# Patient Record
Sex: FEMALE | Race: WHITE | NOT HISPANIC OR LATINO | Employment: FULL TIME | URBAN - METROPOLITAN AREA
[De-identification: names, ages, dates, MRNs, and addresses within clinical notes are randomized per-mention and may not be internally consistent; named-entity substitution may affect disease eponyms.]

---

## 2020-07-13 ENCOUNTER — APPOINTMENT (OUTPATIENT)
Dept: URGENT CARE | Facility: CLINIC | Age: 35
End: 2020-07-13
Payer: COMMERCIAL

## 2020-10-15 ENCOUNTER — GYNECOLOGY VISIT (OUTPATIENT)
Dept: OBGYN | Facility: CLINIC | Age: 35
End: 2020-10-15
Payer: COMMERCIAL

## 2020-10-15 DIAGNOSIS — O92.29 POSTPARTUM NIPPLE PAIN: ICD-10-CM

## 2020-10-15 DIAGNOSIS — O92.70 LACTATION PROBLEM: ICD-10-CM

## 2020-10-15 NOTE — PROGRESS NOTES
Summary: Symone has been struggling to breastfeed since birth. Since being home she attempts to latch at most or all feedings, then pumps and offers a bottle. She was using the Spectra successfully until it stopped working at which time she began using a hand pump every 1-3 hours. The process takes up to 2 hours leaving both parents feeling exhausted and overwhelmed. She has done an excellent job establishing a healthy milk supply and has grown baby Marylou very well. The plan at this time is to take a break from breastfeeding for the next few days. Double pump 8x every 24 hours for 15 minutes. Bottle fed every 2-3 hours during the day and up to 4 hours at night. At this time her Spectra is working but if that changes we discussed the option of renting a hospital grade pump. Will follow up in 5 days.     Subjective:     Symone Salmon is a 34 y.o. female here to establish lactation care. She is here today with  (Sang) and baby, Marylou.    Concerns:   Latch on difficulties , nipple pain , feeling that there is not enough milk , baby cries excessively, breast refusal  and baby always seems hungry     HPI:   Pertinent  history:   Mother does not have a history of advanced maternal age, GDM, hypertension prior to pregnancy, insulin resistance, multiple gestation, PCOS and thyroid disease. Common conditions which may interfere with milk supply.    Breast changes in pregnancy: Yes  History of breast surgeries: Yes, augmentation in 2011. Removed 2019. Reports wide spacing prior to augmentation.     FEEDING HISTORY:    Past breastfeeding history: First baby   Hospital course: Delivered at [facility], minimal help IP. Damaged nipples going home.   Currently 10/15/2020: Attempting to latch every 2-3 hours then using a manual pump and offering bottle. Feedings taking up to 2 hours. Was using Spectra but stopped working so switched to hand pump.   Both breasts: No   Bottle feeds: 8/24h  Not  on breast, bottle feeding and pumping    Supplement: Expressed breast milk, has not used any formula  Quantity: 30-45mls  How given/devices:  Bottle    Nipple Shield Use: None    Breast Pumping:   Frequency: 1-3 hours  Type of pump: Hand Pump   Has Spectra but stopped working and switched to hand pump  Flange size/type: 24mm  NO pain with pumping    ROS:  Constitutional: No fever, chills. Feeling well  Extremities Swelling: No extremity swelling  Gastrointestinal: Negative for nausea and vomiting  Breasts: No soreness of breasts and Soreness of nipples  Psychiatric: Feels exhausted and Feels overwhelmed directly related to feeding difficulty  Mental Health: No mention of feeling irritable, agitated, angry, overwhelmed, apathy, exhaustion nor having sleep changes outside infant feeds/demands or appetite changes.     Objective:     General: no acute distress  Neurological:  Alert and oriented x3  Breasts: Asymmetric , Soft, Plugged Duct - no evidence and Mastitis  - no S/S  Nipples: intact left side / scabbing on right which is healing   Psychiatric: Normal mood and affect. Her behavior is normal. Judgment and thought content normal   Mental Health: Did NOT exhibit sadness, crying, feeling overwhelmed, agitation or hypervigilance.       Assessment/Plan & Lactation Counseling:     Infant Weight History:   10/07/2020: 6# 1oz  10/15/2020: 6# 3.8oz    Pumped: Type of Pump: Hospital grade    Quantity Pumped: L 30mls    R 45mls    Total 75mls (Pumped 2 hours prior to appointment)  Suck Pattern on the bottle: Suck burst and normal rest and Disorganized  Behavior Following Observed Feeding: fussy until changed and dressed   Nipple Pain: Resolving, nipples healing   Nipple Pain from:Nipple damage from accumulated microtrauma which lowered failure strength resulting in sudden damage     Milk Supply Available: normal, establishing at day 8    Diagnosis/Problem  Lactation Problem, Baby not latching well    PLAN  Discussed  concerns and symptoms as listed above in assessment and guidance summarized below.  Topics reviewed included:  • Herbs and medications for increasing supply and their potential side effects and efficacy. No evidence base exists to support their use  • Maternal Mental Health: Discussed the feelings that come  • Sleep or lack of and strategies to manage restorative sleep, although short amounts, significant to the mental health of the mother.   • Self Care. Support, rest, getting out of the house each day. No chores for mom!   • Milk supply is dependent on glandular tissue development, hormonal influences, how many times the baby removes milk and how well the breasts are emptied in a 24 hour period. This is a biological reality that we can NOT work around. If, for any reason, your baby is not latching, or you are not able to nurse, then it is important for you to remove the milk instead by pumping or hand expression.  There's no magic trick, tea, food, drink, cookie or supplement that will increase your milk supply. One  must  effectively remove milk to continue to make and maximize milk. In the early days and weeks that can be 8+ times in 24 hours. For older babies, on average 6-7 + times in 24 hours.    o At this time we are relying on the pump to establish and protect milk supply  • Feeding:   o Feed your baby every 1.5-3 hours, more often if baby acts hungry.   - Up to 4 hours, 2x during the night  o Awaken baby for feeding if going over 3 hours in the day.   o Need to get in 8-12 feedings per 24 hours.   • Supplement: Expressed Breastmilk   ? Paced Bottle Feeding Video: https://www.[employer].com/watch?v=OmFZB9gKR8B  o Baby needs 2 ounces at each feeding, if still showing hunger cues offer an additional 10-15mls  • Pumping Guidelines:  ? Suggested Pump Routine: 3am, 6am, 8am, 10am, 1pm, 4pm, 7pm and 10pm  o Pump 8 times in 24 hours  o Type of pump:  - Spectra  - Always double pump  o How long will vary woman to  woman, typically 8-15 minutes, or 1-2 minutes after flow slows  o Flange Fit: Freely moving nipple in the tunnel with some movement of the areola.  - Today's evaluation indicates appropriate flange  • Increasing supply besides Galactogogues and Pumping:   o Warmth  o Relaxation   o Physical, auditory narratives  o Childbirth relaxation Techniques  o Acupuncture and acupressure  o Shoulder Massages  o Take a nap    • Nipple care:  • May apply breastmilk  • Moist-oily ointment after feeding/pumping, ie Lanolin nipple butter, coconut or olive oil, if desired/needed 2-3 times/day until nipples are healed  • You do not need to wash this off before pumping or feeding the baby    • Connect with other mothers:  o Facebook:   - [place] [facility]: [email or web address]  - Well-Nourished Babies (Private group for questions and support): [email or web address]  o Breastfeeding [place] for support not assessment: Tuesday  at 11am by Glassful,  you will be sent an invitation.   - You may instead copy and paste this link:    [email or web address]    - Passcode  [id number]  - You may share this link with friends; please don't post on social media      In Conclusion:   Family present has verbalized what they can realistically do based on family dynamics, understanding a plan has to be doable to be effective and can be renegotiated at any time.    This is a complex and intimate journey. When obstacles present themselves, it takes confidence, persistence and support. You are now the focus of our Breastfeeding Medicine team; we are here to support your decisions and goals.      Follow up requires close monitoring in this time sensitive window of opportunity to establish milk supply and facilitate the learning of  breastfeeding.    Mom is encouraged to e-mail to update how the plan is working.    Pediatrician appointment: October  21, 2020    Follow-up for infant weight check and dyad breastfeeding evaluation in 5 day(s)  Please call [phone] if you have not scheduled your next appointment    A total of 65 minutes were spent face to face with more than 50% spent counseling and coordinating care as detailed in the above note.        Mar Martinez

## 2020-10-20 ENCOUNTER — OFFICE VISIT (OUTPATIENT)
Dept: OBGYN | Facility: CLINIC | Age: 35
End: 2020-10-20
Payer: COMMERCIAL

## 2020-10-20 DIAGNOSIS — O92.29 POSTPARTUM NIPPLE PAIN: ICD-10-CM

## 2020-10-20 DIAGNOSIS — O92.70 LACTATION PROBLEM: ICD-10-CM

## 2020-10-20 PROCEDURE — 99215 OFFICE O/P EST HI 40 MIN: CPT | Performed by: NURSE PRACTITIONER

## 2020-10-20 NOTE — PROGRESS NOTES
Summary: Making milk with pumping diligently, no formula use. Tries to breastfeed but not convinced baby removing milk. Today baby removed 68% of the available milk, transferring 45ml.  Usually takes a 60ml feeding, great start and parents understand baby is learning but capable and needs practice to learn. Going forward, mom will practice 4x/day and not pump if the feeding was as good as in the office.  Follow up critical to follow weight for continued good growth. Infant 9oz over birthweight day 13.      Subjective:     Symone Salmon is a 34 y.o. female here to establish lactation care with me. She is here today with  (Sang) and baby, Marylou.    Concerns:   Latch on difficulties , nipple pain , feeling that there is not enough milk , baby cries excessively, breast refusal  and baby always seems hungry      HPI:   Pertinent  history:   Mother does not have a history of advanced maternal age, GDM, hypertension prior to pregnancy, insulin resistance, multiple gestation, PCOS and thyroid disease. Common conditions which may interfere with milk supply.     Breast changes in pregnancy: Yes  History of breast surgeries: Yes, augmentation in 2011. Removed 2019. Reports wide spacing prior to augmentation.      FEEDING HISTORY:    Past breastfeeding history: First baby   Hospital course: Delivered at [facility], minimal help IP. Damaged nipples going home.   Prior to visit on 10/15/2020: Attempting to latch every 2-3 hours then using a manual pump and offering bottle. Feedings taking up to 2 hours. Was using Spectra but stopped working so switched to hand pump.   Currently 10/20/2020 Pumping 8xday, offering breast but baby not always sucking and swallowing.   Both breasts: No   Bottle feeds: 8x/24h     Supplement: Expressed breast milk, has not used any formula  Quantity: 60-70mls  How given/devices:  Bottle     Nipple Shield Use: None     Breast Pumping:   Frequency: 3 hours  Type of  pump: Spectra   Flange size/type: 24mm  NO pain with pumping    ROS:  Constitutional: No fever, chills. Feeling well  Extremities Swelling: No extremity swelling  Gastrointestinal: Negative for nausea and vomiting  Breasts: No soreness of breasts and No soreness of nipples  Psychiatric: No mood changes and Managing ok  Mental Health: No mention of feeling irritable, agitated, angry, overwhelmed, apathy, exhaustion nor having sleep changes outside infant feeds/demands or appetite changes       Objective:     General: no acute distress  Neurological:  Alert and oriented x3  Breasts: Symetrical , Soft, Plugged Duct - no evidence and Mastitis  - no S/S  Nipples: intact  Psychiatric:  Normal mood and affect. Her behavior is normal. Judgment and thought content normal   Mental Health:  Did NOT exhibit sadness, crying, feeling overwhelmed, agitation or hypervigilance.     Assessment/Plan & Lactation Counseling:     Infant Weight History:   10/07/2020: 6# 1oz  10/15/2020: 6# 3.8oz  10/20/2020: 6#10.3oz    Infant intake at Breast:: L 0.7oz     R 0.8oz    Total 1.5oz  Milk Transfer at this feeding:   Effective breastfeeding  Pumped: Type of Pump: Spectra    Quantity Pumped: L 9ml    R 12ml    Total 21ml  Initiation of Feeding: Infant initiates  Position of Feeding:    Right: cross cradle  Left: cross cradle  Attachment Achieved: rapidly  Nipple shield: N/A       Suck Pattern at the breast: Suck burst and normal rest and Chewing    Behavior Following Observed Feeding: content     Latch: Mom latches independently  Suckling/Feeding: attaches, audible swallows, baby fed effectively, baby roots, elicits JANNIE and rhythmic  Milk Supply Available: normal     Diagnosis/Problem:  PP nipple pain improving  Lactation problem, baby not latching well at home      PLAN  Discussed concerns and symptoms as listed above in assessment and guidance summarized below.  Topics reviewed included:  •  Milk supply is dependent on glandular tissue  development, hormonal influences, how many times the baby removes milk and how well the breasts are emptied in a 24 hour period. This is a biological reality that we can NOT work around. If, for any reason, your baby is not latching, or you are not able to nurse, then it is important for you to remove the milk instead by pumping or hand expression.  There's no magic trick, tea, food, drink, cookie or supplement that will increase your milk supply. One  must  effectively remove milk to continue to make and maximize milk. In the early days and weeks that can be 8+ times in 24 hours. For older babies, on average 6-7 + times in 24 hours.    •  Feeding:   o Feed your baby every 1.5-3 hours, more often if baby acts hungry.   o Awaken baby for feeding if going over 3 hours in the day.   o Need to get in 8-12 feedings per 24 hours.   •  Given infants weight you may allow baby to go longer at night but that generally means shorter durations in the day.  o Breastfeed and if similar to today, let her rest  o Don't pump, wait until next time  o Top off if you feel the feeidng didn't go well  o Next feeding bottle and pump  - 4 feedings per day breastfeeding then increase with your own confidence  - Weight check important in 7-10 days  •  Supplement: No formula supplement is needed  o Continue with bottles unless breastfeeding  • Trust your reading of the baby  •  Positioning Techniques for bare breast  o Suggested positions Cross cradle  o Fine tune position by making sure your fingers beneath the breast as well as your bra, are out of the way of your baby's chin.  o Positioning:  Many positions shown, great sidelying at 7 minutes.   o See [email or web address]-[email or web address]-[email or web address]-[email or web address]  •  Latch on Techniques for bare breast.   o Fine tune latch:  - By holding your baby more securely at the breast, assisting your baby to stay attached by:  - Bringing your baby to your breast, not  breast to the baby  - Your baby's cheek to touch breast securely, nose tipped back  - Hold your baby firmly in place so when your baby forgets to suck and picks it back up again your baby is in the correct spot. You will be extinguishing behavior and replacing it with a deeper latch to stimulate suck and provide satisfaction at the breast  - Your baby needs as much breast as deep in the mouth as possible to allow your nipples to heal and for you more importantly to maximize efficiency at the breast  - Latch is asymmetrical, leading with the chin, getting more underneath.  •   Pumping Guidelines:  o Both breasts  o Pump 8 times in 24 hours unless more breastfeeding then can start decreasing based on good breastfeeding  o Type of pump:  - Spectra   - Always double pump  o How long will vary woman to woman, typically 8-15 minutes, or 1-2 minutes after flow slows  o Flange Fit: Freely moving nipple in the tunnel with some movement of the areola.  Today's evaluation indicates appropriate flange    •  Connect with other mothers:  o Facebook:   - [place] [facility]: [email or web address]  - Well-Nourished Babies (Private group for questions and support): [email or web address]/groups/[email or web address]  o Breastfeeding [place] for support not assessment: Tuesday  at 11am by Sharita,  you will be sent an invitation.   - You may instead copy and paste this link:    [email or web address]    - Passcode  [id number]  - You may share this link with friends; please don't post on social media      Follow up requires close monitoring in this time sensitive window of opportunity to facilitate the learning of  breastfeeding.    Mom is encouraged to e-mail to update how the plan is working.  Pediatrician appointment: Tomorrow for day 14  Follow-up for infant weight check and dyad breastfeeding evaluation in 7-10 day(s)  Please call [phone] if you have not  scheduled your next appointment    A total of 72 minutes, this does not include infant assessment time, were spent face to face with more than 50% spent counseling and coordinating care as detailed in the above note.     PLEASE NOTE: Some of this note was created using voice recognition software. I have made every reasonable attempt to correct obvious errors, but I expect that there may be errors of grammar and possibly content that I did not discover prior finalizing this note.  CHELSEA Priest.

## 2020-10-23 ENCOUNTER — NON-PROVIDER VISIT (OUTPATIENT)
Dept: OBGYN | Facility: CLINIC | Age: 35
End: 2020-10-23
Payer: COMMERCIAL

## 2020-10-23 NOTE — PROGRESS NOTES
Summary: Symone has been attempting to latch baby several times during the day, without success. She has done an excellent job protecting milk supply with continued pumping. Today, Marylou latched with the nipple shield but was not able to feed effectively. The plan at this time is to practice latching 1-2x during the day, continue to pump 8x every 24 hours, after breastfeeding or in place of breastfeeding. Follow up on 2020.    Subjective:     Symone Salmon is a 34 y.o. female here to establish lactation care with me. She is here today with  (Sang) and baby, Marylou.    Concerns: Latch on difficulties, breast refusal, weight check and feeding evaluation       HPI:   Pertinent  history:   Mother does not have a history of advanced maternal age, GDM, hypertension prior to pregnancy, insulin resistance, multiple gestation, PCOS and thyroid disease. Common conditions which may interfere with milk supply.     Breast changes in pregnancy: Yes  History of breast surgeries: Yes, augmentation in 2011. Removed 2019. Reports wide spacing prior to augmentation.      FEEDING HISTORY:    Past breastfeeding history: First baby   Hospital course: Delivered at [facility], minimal help IP. Damaged nipples going home.   Prior to visit on 10/15/2020: Attempting to latch every 2-3 hours then using a manual pump and offering bottle. Feedings taking up to 2 hours. Was using Spectra but stopped working so switched to hand pump.   Prior to 10/20/2020: Pumping 8xday, offering breast but baby not always sucking and swallowing.   Currently 10/23/2020: Pumping 8x, attempting to offer the breast with difficulty.   Both breasts: No   Bottle feeds: 8x/24h     Supplement: Expressed breast milk, has not used any formula  Quantity: 60-70mls  How given/devices:  Bottle     Nipple Shield Use: None     Breast Pumping:   Frequency: 2-3 hours  Type of pump: Spectra   Flange size/type: 24mm  NO  pain with pumping    ROS:  Constitutional: No fever, chills. Feeling well  Extremities Swelling: No extremity swelling  Gastrointestinal: Negative for nausea and vomiting  Breasts: No soreness of breasts and No soreness of nipples  Psychiatric: No mood changes and Managing ok  Mental Health: No mention of feeling irritable, agitated, angry, overwhelmed, apathy, exhaustion nor having sleep changes outside infant feeds/demands or appetite changes.     Objective:     General: no acute distress  Neurological:  Alert and oriented x3  Breasts: Symetrical , Soft, Plugged Duct - no evidence and Mastitis  - no S/S  Nipples: intact  Psychiatric:  Normal mood and affect. Her behavior is normal. Judgment and thought content normal   Mental Health:  Did NOT exhibit sadness, crying, feeling overwhelmed, agitation or hypervigilance.     Assessment/Plan & Lactation Counseling:     Infant Weight History:   10/07/2020: 6# 1oz  10/15/2020: 6# 3.8oz  10/20/2020: 6# 10.3oz  10/23/2020: 6# 13.2oz    Infant intake at Breast:: L 12mls     R 8mls    Total: 20mls  Milk Transfer at this feeding:    Ineffective breastfeeding  Pumped: Type of Pump: Spectra    Quantity Pumped: L 10mls    R 30mls    Total 40mls  Initiation of Feeding: Infant initiates  Position of Feeding:    Right: cross cradle  Left: cross cradle  Attachment Achieved: rapidly with nipple shield  Nipple shield: Ameda 24mm, Introduced today, Latch achieved with poor milk transfer  Suck Pattern at the breast: Suck burst and normal rest and Chewing    Behavior Following Observed Feeding: content     Latch: Mom latches independently  Suckling/Feeding: attaches, baby roots, elicits JANNIE and rhythmic  Milk Supply Available: normal     Diagnosis/Problem:  Lactation problem, baby not latching well at home      PLAN  Discussed concerns and symptoms as listed above in assessment and guidance summarized below.  Topics reviewed included:  • Milk supply is dependent on glandular tissue  development, hormonal influences, how many times the baby removes milk and how well the breasts are emptied in a 24 hour period. This is a biological reality that we can NOT work around. If, for any reason, your baby is not latching, or you are not able to nurse, then it is important for you to remove the milk instead by pumping or hand expression.  There's no magic trick, tea, food, drink, cookie or supplement that will increase your milk supply. One  must  effectively remove milk to continue to make and maximize milk. In the early days and weeks that can be 8+ times in 24 hours. For older babies, on average 6-7 + times in 24 hours.    o Will rely on the pump to protect milk supply while baby learns to breastfeed  • Feeding:   o Feed your baby every 1.5-3 hours, more often if baby acts hungry.   o Awaken baby for feeding if going over 3 hours in the day.   o Need to get in 8-12 feedings per 24 hours.   o Given infants weight you may allow baby to go longer at night but that generally means shorter durations in the day.  • Practicing at the Breast  o Breastfeed 1-2x during the day for practice  o Up to 10-15 minutes on each side  o Top off if you feel the feeidng didn't go well  o Weight check important in 7-10 days  •  Supplement: No formula supplement is needed  o Continue with bottles of expressed breastmilk   o Trust your reading of the baby  •  Positioning Techniques  o Suggested positions Cross cradle, practice with football on the right breast  o Fine tune position by making sure your fingers beneath the breast as well as your bra, are out of the way of your baby's chin.  o Positioning:  Many positions shown, great sidelying at 7 minutes.   o See http://globalhealthmedia.org/portfolio-items/positions-for-breastfeeding/?rnthiahnnHF=50256  •  Latch on Techniques    o Fine tune latch:  - By holding your baby more securely at the breast, assisting your baby to stay attached by:  - Bringing your baby to your  breast, not breast to the baby  - Your baby's cheek to touch breast securely, nose tipped back  - Hold your baby firmly in place so when your baby forgets to suck and picks it back up again your baby is in the correct spot. You will be extinguishing behavior and replacing it with a deeper latch to stimulate suck and provide satisfaction at the breast  - Your baby needs as much breast as deep in the mouth as possible to allow your nipples to heal and for you more importantly to maximize efficiency at the breast  - Latch is asymmetrical, leading with the chin, getting more underneath.  •   Pumping Guidelines:  o Both breasts  o Pump 8 times in 24 hours   o Type of pump:  o Spectra   o Always double pump  o How long will vary woman to woman, typically 8-15 minutes, or 1-2 minutes after flow slows  o Flange Fit: Freely moving nipple in the tunnel with some movement of the areola.  - Today's evaluation indicates appropriate flange    •  Connect with other mothers:  o Facebook:   - [place] Breast[facility]eds: [email or web address]  - Well-Nourished Babies (Private group for questions and support): https://www.facebook.com/groups/315887522607451/  o Breastfeeding [place] for support not assessment: Tuesday  at 11am by Leannaom,  you will be sent an invitation.   - You may instead copy and paste this link:    [email or web address]    - Passcode  [id number]  - You may share this link with friends; please don't post on social media      Follow up requires close monitoring in this time sensitive window of opportunity to facilitate the learning of  breastfeeding.    Mom is encouraged to e-mail to update how the plan is working.    Pediatrician appointment: 2 month well check, beginning of December    Follow-up for infant weight check and dyad breastfeeding evaluation in 10 day(s)  Please call [phone] if you have not scheduled your next appointment    A  total of 65 minutes were spent face to face with more than 50% spent counseling and coordinating care as detailed in the above note.     PLEASE NOTE: Some of this note was created using voice recognition software. I have made every reasonable attempt to correct obvious errors, but I expect that there may be errors of grammar and possibly content that I did not discover prior finalizing this note.    Mar Martinez

## 2020-11-02 ENCOUNTER — GYNECOLOGY VISIT (OUTPATIENT)
Dept: OBGYN | Facility: CLINIC | Age: 35
End: 2020-11-02
Payer: OTHER GOVERNMENT

## 2020-11-02 DIAGNOSIS — O92.70 LACTATION PROBLEM: ICD-10-CM

## 2020-11-02 PROCEDURE — 99215 OFFICE O/P EST HI 40 MIN: CPT | Performed by: NURSE PRACTITIONER

## 2020-11-02 NOTE — PROGRESS NOTES
Summary: Symone has been diligently pumping 8x every 24 hours, noticing a steady increase in her production. She attempts to latch 1-2x during the day, not feeling the feedings are effective. She is overwhelmed with triple feeding and feels her day is consumed with pumping/feeding. Today we discussed different options including cutting back on pumping up to weaning entirely from pumping. A decision does not have to be made immediately, but gives her options moving forward depending on how she is feeling about things as a whole. Follow up provided as needed.     Subjective:     Symone Salmon is a 35 y.o. female here for lactation care. She is here today with  (Sang) and baby, Marylou.    Concerns: Latch on difficulties, breast refusal, weight check and feeding evaluation       HPI:   Pertinent  history:   Mother does not have a history of advanced maternal age, GDM, hypertension prior to pregnancy, insulin resistance, multiple gestation, PCOS and thyroid disease. Common conditions which may interfere with milk supply.     Breast changes in pregnancy: Yes  History of breast surgeries: Yes, augmentation in 2011. Removed 2019. Reports wide spacing prior to augmentation.      FEEDING HISTORY:    Past breastfeeding history: First baby   Hospital course: Delivered at [facility], minimal help IP. Damaged nipples going home.   Prior to visit on 10/15/2020: Attempting to latch every 2-3 hours then using a manual pump and offering bottle. Feedings taking up to 2 hours. Was using Spectra but stopped working so switched to hand pump.   Prior to 10/20/2020: Pumping 8xday, offering breast but baby not always sucking and swallowing.   Prior to 10/23/2020: Pumping 8x, attempting to offer the breast with difficulty.   Currently 2020: Pumping 8x. Attempting to latch 1-2x during the day. Most of the time feeling like she doesn't get enough from the breast.   Both breasts: No   Bottle feeds:  8x/24h     Supplement: Expressed breast milk, has not used any formula  Quantity: 3-3.5oz  How given/devices:  Bottle, feeding taking up to 1 hour, using Dr. Otto, Level 1 nipples     Nipple Shield Use: None, reports baby does not latch well with it     Breast Pumping:   Frequency: 2-3 hours  Type of pump: Spectra   Flange size/type: 24mm  NO pain with pumping    ROS:  Constitutional: No fever, chills. Feeling well  Extremities Swelling: No extremity swelling  Gastrointestinal: Negative for nausea and vomiting  Breasts: No soreness of breasts and No soreness of nipples  Psychiatric: No mood changes and Managing ok  Mental Health: No mention of feeling irritable, agitated, angry, overwhelmed, apathy, exhaustion nor having sleep changes outside infant feeds/demands or appetite changes.      Objective:     General: no acute distress  Neurological:  Alert and oriented x3  Breasts: Symetrical , Soft, Plugged Duct - no evidence and Mastitis  - no S/S  Nipples: intact  Psychiatric:  Normal mood and affect. Her behavior is normal. Judgment and thought content normal   Mental Health:  Did NOT exhibit sadness, crying, feeling overwhelmed, agitation or hypervigilance.  11/2/2020: Symone reports she is struggling and has an appointment with a counselor/therapist tomorrow. Also provided contact information for [facility].      Assessment/Plan & Lactation Counseling:     Infant Weight History:   10/07/2020: 6# 1oz  10/15/2020: 6# 3.8oz  10/20/2020: 6# 10.3oz  10/23/2020: 6# 13.2oz  11/02/2020: 7# 9.5oz    Infant intake at Breast:: L 16mls     R 22mls    Total: 38mls  Milk Transfer at this feeding:    Ineffective breastfeeding, more effective than previous feeding  Pumped: Type of Pump: Spectra    Quantity Pumped: L 15mls    R 30mls    Total 45mls  Initiation of Feeding: Infant initiates  Position of Feeding:    Right: cross cradle  Left: cross cradle  Attachment Achieved: rapidly   Nipple shield: N/A    Behavior Following  Observed Feeding: rooting, bottle offered but fell asleep    Latch: Mom latches independently  Suckling/Feeding: attaches, baby roots, elicits JANNIE and rhythmic  Milk Supply Available: normal     Diagnosis/Problem:  Lactation problem, baby not latching well at home      PLAN  Discussed concerns and symptoms as listed above in assessment and guidance summarized below.  Topics reviewed included:  • Milk supply is dependent on glandular tissue development, hormonal influences, how many times the baby removes milk and how well the breasts are emptied in a 24 hour period. This is a biological reality that we can NOT work around. If, for any reason, your baby is not latching, or you are not able to nurse, then it is important for you to remove the milk instead by pumping or hand expression.  There's no magic trick, tea, food, drink, cookie or supplement that will increase your milk supply. One  must  effectively remove milk to continue to make and maximize milk. In the early days and weeks that can be 8+ times in 24 hours. For older babies, on average 6-7 + times in 24 hours.    • Decrease Pumping / Weaning from Pumping  o Can cut back on the number of times pumping in 24 hours   - 6-7x and reevaluate  o Option of weaning from pumping as desired  • Feeding:   o Feed your baby every 1.5-3 hours, more often if baby acts hungry.   o Given infants weight you may allow baby to go longer at night but that generally means shorter durations in the day.  • Supplement: No formula supplement is needed  o Continue with bottles of expressed breastmilk   o Trust your reading of the baby  • Pumping Guidelines:  o Both breasts  o Pump 8 times in 24 hours  • Discussed decreasing the total number of pumps over 24 hour period   o Type of pump:  o Spectra   o Always double pump  o How long will vary woman to woman, typically 8-15 minutes, or 1-2 minutes after flow slows  o Flange Fit: Freely moving nipple in the tunnel with some movement of  the areola.  - Today's evaluation indicates appropriate flange    •  Connect with other mothers:  o Facebook:   - [place] [facility]: [email or web address]/[email or web address].[email or web address]  - Well-Nourished Babies (Private group for questions and support): https://www.facebook.com/groups/988078526176953/  o Breastfeeding [place] for support not assessment: Tuesday  at 11am by idemama,  you will be sent an invitation.   - You may instead copy and paste this link:    [email or web address]    - Passcode  [id number]  - You may share this link with friends; please don't post on social media      Follow up requires close monitoring in this time sensitive window of opportunity to facilitate the learning of  breastfeeding.    Mom is encouraged to e-mail to update how the plan is working.    Pediatrician appointment: 2 month well check, beginning of December    Follow-up for infant weight check and dyad breastfeeding evaluation as needed  Please call [phone] if you have not scheduled your next appointment    A total of 68 minutes were spent face to face with more than 50% spent counseling and coordinating care as detailed in the above note.     PLEASE NOTE: Some of this note was created using voice recognition software. I have made every reasonable attempt to correct obvious errors, but I expect that there may be errors of grammar and possibly content that I did not discover prior finalizing this note.    Mar Martinez

## 2020-11-16 ENCOUNTER — PRE-ADMISSION TESTING (OUTPATIENT)
Dept: ADMISSIONS | Facility: MEDICAL CENTER | Age: 35
End: 2020-11-16
Attending: SPECIALIST
Payer: OTHER GOVERNMENT

## 2020-11-16 DIAGNOSIS — Z01.812 PRE-OPERATIVE LABORATORY EXAMINATION: ICD-10-CM

## 2020-11-16 LAB
ANION GAP SERPL CALC-SCNC: 7 MMOL/L (ref 7–16)
BUN SERPL-MCNC: 9 MG/DL (ref 8–22)
CALCIUM SERPL-MCNC: 10 MG/DL (ref 8.5–10.5)
CHLORIDE SERPL-SCNC: 102 MMOL/L (ref 96–112)
CO2 SERPL-SCNC: 27 MMOL/L (ref 20–33)
COVID ORDER STATUS COVID19: NORMAL
CREAT SERPL-MCNC: 0.84 MG/DL (ref 0.5–1.4)
ERYTHROCYTE [DISTWIDTH] IN BLOOD BY AUTOMATED COUNT: 38.6 FL (ref 35.9–50)
GLUCOSE SERPL-MCNC: 92 MG/DL (ref 65–99)
HCG SERPL QL: NEGATIVE
HCT VFR BLD AUTO: 41.4 % (ref 37–47)
HGB BLD-MCNC: 14.1 G/DL (ref 12–16)
MCH RBC QN AUTO: 31.7 PG (ref 27–33)
MCHC RBC AUTO-ENTMCNC: 34.1 G/DL (ref 33.6–35)
MCV RBC AUTO: 93 FL (ref 81.4–97.8)
PLATELET # BLD AUTO: 209 K/UL (ref 164–446)
PMV BLD AUTO: 11.2 FL (ref 9–12.9)
POTASSIUM SERPL-SCNC: 4.2 MMOL/L (ref 3.6–5.5)
RBC # BLD AUTO: 4.45 M/UL (ref 4.2–5.4)
SARS-COV-2 RNA RESP QL NAA+PROBE: NOTDETECTED
SODIUM SERPL-SCNC: 136 MMOL/L (ref 135–145)
SPECIMEN SOURCE: NORMAL
WBC # BLD AUTO: 8.8 K/UL (ref 4.8–10.8)

## 2020-11-16 PROCEDURE — 36415 COLL VENOUS BLD VENIPUNCTURE: CPT

## 2020-11-16 PROCEDURE — 80048 BASIC METABOLIC PNL TOTAL CA: CPT

## 2020-11-16 PROCEDURE — U0003 INFECTIOUS AGENT DETECTION BY NUCLEIC ACID (DNA OR RNA); SEVERE ACUTE RESPIRATORY SYNDROME CORONAVIRUS 2 (SARS-COV-2) (CORONAVIRUS DISEASE [COVID-19]), AMPLIFIED PROBE TECHNIQUE, MAKING USE OF HIGH THROUGHPUT TECHNOLOGIES AS DESCRIBED BY CMS-2020-01-R: HCPCS

## 2020-11-16 PROCEDURE — 84703 CHORIONIC GONADOTROPIN ASSAY: CPT

## 2020-11-16 PROCEDURE — 85027 COMPLETE CBC AUTOMATED: CPT

## 2020-11-19 ENCOUNTER — ANESTHESIA (OUTPATIENT)
Dept: SURGERY | Facility: MEDICAL CENTER | Age: 35
End: 2020-11-19
Payer: OTHER GOVERNMENT

## 2020-11-19 ENCOUNTER — HOSPITAL ENCOUNTER (OUTPATIENT)
Facility: MEDICAL CENTER | Age: 35
End: 2020-11-19
Attending: SPECIALIST | Admitting: SPECIALIST
Payer: OTHER GOVERNMENT

## 2020-11-19 ENCOUNTER — ANESTHESIA EVENT (OUTPATIENT)
Dept: SURGERY | Facility: MEDICAL CENTER | Age: 35
End: 2020-11-19
Payer: OTHER GOVERNMENT

## 2020-11-19 VITALS
HEIGHT: 63 IN | HEART RATE: 69 BPM | DIASTOLIC BLOOD PRESSURE: 76 MMHG | OXYGEN SATURATION: 97 % | WEIGHT: 134.48 LBS | TEMPERATURE: 98.1 F | BODY MASS INDEX: 23.83 KG/M2 | SYSTOLIC BLOOD PRESSURE: 110 MMHG | RESPIRATION RATE: 20 BRPM

## 2020-11-19 DIAGNOSIS — G89.18 POST-OP PAIN: ICD-10-CM

## 2020-11-19 PROBLEM — Z30.2 ENCOUNTER FOR STERILIZATION: Status: ACTIVE | Noted: 2020-11-19

## 2020-11-19 PROCEDURE — 160041 HCHG SURGERY MINUTES - EA ADDL 1 MIN LEVEL 4: Performed by: SPECIALIST

## 2020-11-19 PROCEDURE — 88302 TISSUE EXAM BY PATHOLOGIST: CPT

## 2020-11-19 PROCEDURE — 501577 HCHG TROCAR, STEP 11MM: Performed by: SPECIALIST

## 2020-11-19 PROCEDURE — 500886 HCHG PACK, LAPAROSCOPY: Performed by: SPECIALIST

## 2020-11-19 PROCEDURE — 700111 HCHG RX REV CODE 636 W/ 250 OVERRIDE (IP): Performed by: ANESTHESIOLOGY

## 2020-11-19 PROCEDURE — 160009 HCHG ANES TIME/MIN: Performed by: SPECIALIST

## 2020-11-19 PROCEDURE — 160046 HCHG PACU - 1ST 60 MINS PHASE II: Performed by: SPECIALIST

## 2020-11-19 PROCEDURE — 160002 HCHG RECOVERY MINUTES (STAT): Performed by: SPECIALIST

## 2020-11-19 PROCEDURE — A9270 NON-COVERED ITEM OR SERVICE: HCPCS | Performed by: ANESTHESIOLOGY

## 2020-11-19 PROCEDURE — 500002 HCHG ADHESIVE, DERMABOND: Performed by: SPECIALIST

## 2020-11-19 PROCEDURE — 160048 HCHG OR STATISTICAL LEVEL 1-5: Performed by: SPECIALIST

## 2020-11-19 PROCEDURE — 700101 HCHG RX REV CODE 250: Performed by: ANESTHESIOLOGY

## 2020-11-19 PROCEDURE — 160025 RECOVERY II MINUTES (STATS): Performed by: SPECIALIST

## 2020-11-19 PROCEDURE — 160029 HCHG SURGERY MINUTES - 1ST 30 MINS LEVEL 4: Performed by: SPECIALIST

## 2020-11-19 PROCEDURE — A9270 NON-COVERED ITEM OR SERVICE: HCPCS | Performed by: SPECIALIST

## 2020-11-19 PROCEDURE — 501579 HCHG TROCAR, STEP 5MM: Performed by: SPECIALIST

## 2020-11-19 PROCEDURE — 700102 HCHG RX REV CODE 250 W/ 637 OVERRIDE(OP): Performed by: SPECIALIST

## 2020-11-19 PROCEDURE — 700105 HCHG RX REV CODE 258: Performed by: SPECIALIST

## 2020-11-19 PROCEDURE — 500854 HCHG NEEDLE, INSUFFLATION FOR STEP: Performed by: SPECIALIST

## 2020-11-19 PROCEDURE — 160035 HCHG PACU - 1ST 60 MINS PHASE I: Performed by: SPECIALIST

## 2020-11-19 PROCEDURE — 502704 HCHG DEVICE, LIGASURE IMPACT: Performed by: SPECIALIST

## 2020-11-19 PROCEDURE — 501838 HCHG SUTURE GENERAL: Performed by: SPECIALIST

## 2020-11-19 PROCEDURE — 700102 HCHG RX REV CODE 250 W/ 637 OVERRIDE(OP): Performed by: ANESTHESIOLOGY

## 2020-11-19 PROCEDURE — 160047 HCHG PACU  - EA ADDL 30 MINS PHASE II: Performed by: SPECIALIST

## 2020-11-19 RX ORDER — OXYCODONE HCL 5 MG/5 ML
5 SOLUTION, ORAL ORAL
Status: COMPLETED | OUTPATIENT
Start: 2020-11-19 | End: 2020-11-19

## 2020-11-19 RX ORDER — SIMETHICONE 80 MG
80 TABLET,CHEWABLE ORAL EVERY 8 HOURS PRN
Status: DISCONTINUED | OUTPATIENT
Start: 2020-11-19 | End: 2020-11-19 | Stop reason: HOSPADM

## 2020-11-19 RX ORDER — MIDAZOLAM HYDROCHLORIDE 1 MG/ML
INJECTION INTRAMUSCULAR; INTRAVENOUS PRN
Status: DISCONTINUED | OUTPATIENT
Start: 2020-11-19 | End: 2020-11-19 | Stop reason: SURG

## 2020-11-19 RX ORDER — OXYCODONE HCL 5 MG/5 ML
10 SOLUTION, ORAL ORAL
Status: COMPLETED | OUTPATIENT
Start: 2020-11-19 | End: 2020-11-19

## 2020-11-19 RX ORDER — HALOPERIDOL 5 MG/ML
1 INJECTION INTRAMUSCULAR
Status: DISCONTINUED | OUTPATIENT
Start: 2020-11-19 | End: 2020-11-19 | Stop reason: HOSPADM

## 2020-11-19 RX ORDER — SODIUM CHLORIDE, SODIUM LACTATE, POTASSIUM CHLORIDE, CALCIUM CHLORIDE 600; 310; 30; 20 MG/100ML; MG/100ML; MG/100ML; MG/100ML
INJECTION, SOLUTION INTRAVENOUS CONTINUOUS
Status: DISCONTINUED | OUTPATIENT
Start: 2020-11-19 | End: 2020-11-19 | Stop reason: HOSPADM

## 2020-11-19 RX ORDER — DIPHENHYDRAMINE HYDROCHLORIDE 50 MG/ML
12.5 INJECTION INTRAMUSCULAR; INTRAVENOUS
Status: DISCONTINUED | OUTPATIENT
Start: 2020-11-19 | End: 2020-11-19 | Stop reason: HOSPADM

## 2020-11-19 RX ORDER — IBUPROFEN 800 MG/1
800 TABLET ORAL EVERY 8 HOURS PRN
Qty: 30 TAB | Refills: 0 | Status: SHIPPED | OUTPATIENT
Start: 2020-11-19 | End: 2021-08-09

## 2020-11-19 RX ORDER — ONDANSETRON 2 MG/ML
INJECTION INTRAMUSCULAR; INTRAVENOUS PRN
Status: DISCONTINUED | OUTPATIENT
Start: 2020-11-19 | End: 2020-11-19 | Stop reason: SURG

## 2020-11-19 RX ORDER — HYDROMORPHONE HYDROCHLORIDE 1 MG/ML
0.5 INJECTION, SOLUTION INTRAMUSCULAR; INTRAVENOUS; SUBCUTANEOUS
Status: DISCONTINUED | OUTPATIENT
Start: 2020-11-19 | End: 2020-11-19 | Stop reason: HOSPADM

## 2020-11-19 RX ORDER — ROCURONIUM BROMIDE 10 MG/ML
INJECTION, SOLUTION INTRAVENOUS PRN
Status: DISCONTINUED | OUTPATIENT
Start: 2020-11-19 | End: 2020-11-19 | Stop reason: SURG

## 2020-11-19 RX ORDER — PROMETHAZINE HYDROCHLORIDE 25 MG/1
12.5 SUPPOSITORY RECTAL EVERY 4 HOURS PRN
Status: DISCONTINUED | OUTPATIENT
Start: 2020-11-19 | End: 2020-11-19 | Stop reason: HOSPADM

## 2020-11-19 RX ORDER — OXYCODONE HYDROCHLORIDE AND ACETAMINOPHEN 5; 325 MG/1; MG/1
1 TABLET ORAL EVERY 6 HOURS PRN
Qty: 28 TAB | Refills: 0 | Status: SHIPPED | OUTPATIENT
Start: 2020-11-19 | End: 2020-11-26

## 2020-11-19 RX ORDER — DEXAMETHASONE SODIUM PHOSPHATE 4 MG/ML
INJECTION, SOLUTION INTRA-ARTICULAR; INTRALESIONAL; INTRAMUSCULAR; INTRAVENOUS; SOFT TISSUE PRN
Status: DISCONTINUED | OUTPATIENT
Start: 2020-11-19 | End: 2020-11-19 | Stop reason: SURG

## 2020-11-19 RX ORDER — MEPERIDINE HYDROCHLORIDE 25 MG/ML
12.5 INJECTION INTRAMUSCULAR; INTRAVENOUS; SUBCUTANEOUS
Status: DISCONTINUED | OUTPATIENT
Start: 2020-11-19 | End: 2020-11-19 | Stop reason: HOSPADM

## 2020-11-19 RX ORDER — HYDROMORPHONE HYDROCHLORIDE 1 MG/ML
0.4 INJECTION, SOLUTION INTRAMUSCULAR; INTRAVENOUS; SUBCUTANEOUS
Status: DISCONTINUED | OUTPATIENT
Start: 2020-11-19 | End: 2020-11-19 | Stop reason: HOSPADM

## 2020-11-19 RX ORDER — HYDRALAZINE HYDROCHLORIDE 20 MG/ML
5 INJECTION INTRAMUSCULAR; INTRAVENOUS
Status: DISCONTINUED | OUTPATIENT
Start: 2020-11-19 | End: 2020-11-19 | Stop reason: HOSPADM

## 2020-11-19 RX ORDER — HYDROMORPHONE HYDROCHLORIDE 1 MG/ML
0.2 INJECTION, SOLUTION INTRAMUSCULAR; INTRAVENOUS; SUBCUTANEOUS
Status: DISCONTINUED | OUTPATIENT
Start: 2020-11-19 | End: 2020-11-19 | Stop reason: HOSPADM

## 2020-11-19 RX ORDER — LIDOCAINE HYDROCHLORIDE 20 MG/ML
INJECTION, SOLUTION EPIDURAL; INFILTRATION; INTRACAUDAL; PERINEURAL PRN
Status: DISCONTINUED | OUTPATIENT
Start: 2020-11-19 | End: 2020-11-19 | Stop reason: SURG

## 2020-11-19 RX ORDER — CEFAZOLIN SODIUM 1 G/3ML
INJECTION, POWDER, FOR SOLUTION INTRAMUSCULAR; INTRAVENOUS PRN
Status: DISCONTINUED | OUTPATIENT
Start: 2020-11-19 | End: 2020-11-19 | Stop reason: SURG

## 2020-11-19 RX ORDER — LABETALOL HYDROCHLORIDE 5 MG/ML
5 INJECTION, SOLUTION INTRAVENOUS
Status: DISCONTINUED | OUTPATIENT
Start: 2020-11-19 | End: 2020-11-19 | Stop reason: HOSPADM

## 2020-11-19 RX ADMIN — ROCURONIUM BROMIDE 50 MG: 10 INJECTION, SOLUTION INTRAVENOUS at 16:37

## 2020-11-19 RX ADMIN — MIDAZOLAM HYDROCHLORIDE 2 MG: 1 INJECTION, SOLUTION INTRAMUSCULAR; INTRAVENOUS at 16:26

## 2020-11-19 RX ADMIN — FENTANYL CITRATE 25 MCG: 50 INJECTION, SOLUTION INTRAMUSCULAR; INTRAVENOUS at 17:44

## 2020-11-19 RX ADMIN — LIDOCAINE HYDROCHLORIDE 60 MG: 20 INJECTION, SOLUTION EPIDURAL; INFILTRATION; INTRACAUDAL at 16:36

## 2020-11-19 RX ADMIN — POVIDONE IODINE 15 ML: 100 SOLUTION TOPICAL at 14:59

## 2020-11-19 RX ADMIN — FENTANYL CITRATE 25 MCG: 50 INJECTION, SOLUTION INTRAMUSCULAR; INTRAVENOUS at 18:27

## 2020-11-19 RX ADMIN — SODIUM CHLORIDE, POTASSIUM CHLORIDE, SODIUM LACTATE AND CALCIUM CHLORIDE: 600; 310; 30; 20 INJECTION, SOLUTION INTRAVENOUS at 15:00

## 2020-11-19 RX ADMIN — CEFAZOLIN 2 G: 330 INJECTION, POWDER, FOR SOLUTION INTRAMUSCULAR; INTRAVENOUS at 16:41

## 2020-11-19 RX ADMIN — OXYCODONE HYDROCHLORIDE 5 MG: 5 SOLUTION ORAL at 18:17

## 2020-11-19 RX ADMIN — PROPOFOL 120 MG: 10 INJECTION, EMULSION INTRAVENOUS at 16:36

## 2020-11-19 RX ADMIN — FENTANYL CITRATE 25 MCG: 50 INJECTION, SOLUTION INTRAMUSCULAR; INTRAVENOUS at 18:14

## 2020-11-19 RX ADMIN — SUGAMMADEX 200 MG: 100 INJECTION, SOLUTION INTRAVENOUS at 17:44

## 2020-11-19 RX ADMIN — ONDANSETRON 4 MG: 2 INJECTION INTRAMUSCULAR; INTRAVENOUS at 17:31

## 2020-11-19 RX ADMIN — FENTANYL CITRATE 50 MCG: 50 INJECTION, SOLUTION INTRAMUSCULAR; INTRAVENOUS at 16:36

## 2020-11-19 RX ADMIN — DEXAMETHASONE SODIUM PHOSPHATE 8 MG: 4 INJECTION, SOLUTION INTRA-ARTICULAR; INTRALESIONAL; INTRAMUSCULAR; INTRAVENOUS; SOFT TISSUE at 16:41

## 2020-11-19 ASSESSMENT — PAIN DESCRIPTION - PAIN TYPE
TYPE: ACUTE PAIN;SURGICAL PAIN
TYPE: ACUTE PAIN
TYPE: ACUTE PAIN;SURGICAL PAIN
TYPE: ACUTE PAIN
TYPE: ACUTE PAIN

## 2020-11-19 NOTE — H&P
Symone Salmon          YOB: 1985  Date of today's surgery:   Facility: [facility]    ID: The patient is a very pleasant 35-year-old primipara (para-1, with 1 previous vaginal delivery).    Chief Complaint: The patient desires permanent tubal sterilization.    History of Present Illness: The patient went on to have a normal spontaneous vaginal delivery Wednesday morning, 2020 (6 weeks and 1 day ago), at 39 weeks and 3 days gestation, and she was delivered of a live term female  with Apgar scores of nine and nine at 1 and 5 minutes respectively and a  weight of 2750 grams at [facility]. She desires permanent tubal sterilization. She is scheduled today to have laparoscopic bilateral tubal sterilization procedure, namely lab discovered bilateral salpingectomy. I have discussed with her and actually to her in detail and at length what laparoscopic bilateral salpingectomy is and what laparoscopic bilateral salpingectomy involves and I have discussed with her and explained to her in detail and at length the risks and benefits and alternatives of laparoscopic bilateral salpingectomy. After our discussions and after answering her questions she told me that she very much wishes for us to proceed with laparoscopic bilateral salpingectomy.    Past Medical History: The patient has a history of migraine headaches. She says that otherwise she has no other medical illnesses.    Past Surgical History: The patient has had breast augmentation. She has had a tonsillectomy.    Medications: The patient has been prescribed Zoloft recently and is considering taking this. She has been taking prenatal vitamins. She says otherwise she takes no medications.    Allergies: The patient tells me that she is allergic to codeine and that she is allergic to oral contraceptive pills.    Social History: The patient  denies smoking. She denies consuming alcoholic beverages. She denies the use of recreational drugs.    Review of Systems  General: The patient denies any fevers, chills, sweats.  Pulmonary: The patient denies any coughing, wheezing, chest pain, shortness of breath.  Cardiovascular: The patient denies any palpitations, dyspnea, chest pain.  Gastrointestinal: The patient denies any nausea, vomiting, diarrhea, constipation, hematochezia, melena, history of hepatitis, history of jaundice.  Genitourinary: The patient denies any heavy vaginal bleeding. She denies any dysuria or hematuria.  Musculoskeletal: The patient denies any arthralgias or myalgias.   Neurological: No headaches or syncope or seizures.     Physical Exam:   Vital Signs: The patient's vital signs are stable and she is afebrile.  General: The patient appears well developed and well nourished and relaxed and alert and comfortable and in no apparent distress.    HEENT :  Normo-cephalic, atraumatic, pupils equal, round, reactive to light and accommodation, extra ocular motions intact, pharynx clear; there is no thyromegaly. There is no cervical lymphadenopathy.  Chest: Heart regular rate and rhythm, with no murmurs or rubs or gallops; the lungs are clear to auscultation bilaterally.  Abdomen: The abdomen is soft and flat and non-tender and non-distended. There is no hepatomegaly. There is no splenomegaly.   Pelvic: Bimanual exam reveals no evidence of uterine enlargement and no cervical motion tenderness and no evidence of any tenderness to palpation of the uterine corpus and no evidence of any adnexal masses or tenderness either on the right or the left.  Extremities: No clubbing or cyanosis or edema.   Neurological: non-focal.     Assessment:   The patient is parous and desires permanent tubal sterilization in the form of laparoscopic bilateral tubal sterilization procedure.    Plan:   We will proceed today with laparoscopic bilateral salpingectomy. Please  see above.  Addendum: The patient did point out to me (at the time of her recent visit in the office) that she noticed which she described as a lump or bump on the perineum and told me that this lump or bump is tender.  I examined this in the office and findings were consistent with a very tiny very superficial abscess in the middle of the perineum along the line where the episiotomy was repaired.  I explained to her that I could incise and drain or remove this abscess at the time of her laparoscopic tubal sterilization procedure while she is still under anesthesia and she told me that she would very much like for me to do this and she reminded me when I just saw her in preop that she would like for me to incise and drain and/or remove what appears to be a very tiny superficial abscess on the perineum.            ________________________  Amaury Lincoln M.D.

## 2020-11-20 LAB — PATHOLOGY CONSULT NOTE: NORMAL

## 2020-11-20 NOTE — ANESTHESIA PREPROCEDURE EVALUATION
Relevant Problems   No relevant active problems     Anes H&P:  PAST MEDICAL HISTORY:   35 y.o. female who presents for Procedure(s) (LRB):  PELVISCOPY  SALPINGECTOMY (Bilateral).  She has current and past medical problems significant for:    History reviewed. No pertinent past medical history.    SMOKING/ALCOHOL/RECREATIONAL DRUG USE:  Social History     Tobacco Use   • Smoking status: Never Smoker   • Smokeless tobacco: Never Used   Substance Use Topics   • Alcohol use: Not Currently   • Drug use: Never     Social History     Substance and Sexual Activity   Drug Use Never       PAST SURGICAL HISTORY:  Past Surgical History:   Procedure Laterality Date   • OTHER  2019    breast implant removal   • OTHER ORTHOPEDIC SURGERY Left 2018    hip   • MAMMOPLASTY AUGMENTATION  5/26/2011    Performed by RENA DENNIS at [facility]   • OTHER ORTHOPEDIC SURGERY Right 2001    ACL   • OTHER  2000    tonsils & wisdom teeth       ALLERGIES:   Allergies   Allergen Reactions   • Codeine Vomiting   • Gluten Meal        MEDICATIONS:  No current facility-administered medications on file prior to encounter.      Current Outpatient Medications on File Prior to Encounter   Medication Sig Dispense Refill   • sertraline (ZOLOFT) 50 MG Tab Take 25 mg by mouth every day.         LABS:  Lab Results   Component Value Date/Time    HEMOGLOBIN 14.1 11/16/2020 1435    HEMATOCRIT 41.4 11/16/2020 1435    WBC 8.8 11/16/2020 1435     Lab Results   Component Value Date/Time    SODIUM 136 11/16/2020 1435    POTASSIUM 4.2 11/16/2020 1435    CHLORIDE 102 11/16/2020 1435    CO2 27 11/16/2020 1435    GLUCOSE 92 11/16/2020 1435    BUN 9 11/16/2020 1435    CALCIUM 10.0 11/16/2020 1435       SARS-CoV2 result: Negative      PREVIOUS ANESTHETICS: See EMR  __________________________________________      Physical Exam    Airway   Mallampati: II  TM distance: >3 FB  Neck ROM: full       Cardiovascular - normal exam  Rhythm: regular  Rate:  normal  (-) murmur     Dental - normal exam           Pulmonary - normal exam  Breath sounds clear to auscultation     Abdominal   (-) obese  Abdomen: soft     Neurological - normal exam                 Anesthesia Plan    ASA 2       Plan - general       Airway plan will be ETT        Induction: intravenous    Postoperative Plan: Postoperative administration of opioids is intended.    Pertinent diagnostic labs and testing reviewed    Informed Consent:    Anesthetic plan and risks discussed with patient.    Use of blood products discussed with: patient whom consented to blood products.

## 2020-11-20 NOTE — ANESTHESIA TIME REPORT
Anesthesia Start and Stop Event Times     Date Time Event    11/19/2020 1609 Ready for Procedure     1620 Anesthesia Start     1757 Anesthesia Stop        Responsible Staff  11/19/20    Name Role Begin End    Supa Bacon M.D. Anesth 1620 1757        Preop Diagnosis (Free Text):  Pre-op Diagnosis     PERMANENT STERILIZATION        Preop Diagnosis (Codes):    Post op Diagnosis  Encounter for sterilization      Premium Reason  A. 3PM - 7AM    Comments:

## 2020-11-20 NOTE — OR NURSING
1840 assumed care pt resting comfortable report recvd from Dandre liz  minimal pain without nausea scant bleeding   1930 up to bathroom able to void bleeding scant dressed in bathroom   2005 iv removed reviewed all dc instructions with spouse declines wheelchair out ambulates out of unit with steady gait with all belongings accomp with spouse

## 2020-11-20 NOTE — DISCHARGE INSTRUCTIONS
ACTIVITY: Rest and take it easy for the first 24 hours.  A responsible adult is recommended to remain with you during that time.  It is normal to feel sleepy.  We encourage you to not do anything that requires balance, judgment or coordination.    MILD FLU-LIKE SYMPTOMS ARE NORMAL. YOU MAY EXPERIENCE GENERALIZED MUSCLE ACHES, THROAT IRRITATION, HEADACHE AND/OR SOME NAUSEA.    FOR 24 HOURS DO NOT:  Drive, operate machinery or run household appliances.  Drink beer or alcoholic beverages.   Make important decisions or sign legal documents.    SPECIAL INSTRUCTIONS: see attached handout on post op home care instructions    DIET: To avoid nausea, slowly advance diet as tolerated, avoiding spicy or greasy foods for the first day.  Add more substantial food to your diet according to your physician's instructions.  Babies can be fed formula or breast milk as soon as they are hungry.  INCREASE FLUIDS AND FIBER TO AVOID CONSTIPATION.    SURGICAL DRESSING/BATHING: *see pelviscopy discharge sheet **    FOLLOW-UP APPOINTMENT:  A follow-up appointment should be arranged with your doctor; call to schedule if not already scheduled.    You should CALL YOUR PHYSICIAN if you develop:  Fever greater than 101 degrees F.  Pain not relieved by medication, or persistent nausea or vomiting.  Excessive bleeding (blood soaking through dressing) or unexpected drainage from the wound.  Extreme redness or swelling around the incision site, drainage of pus or foul smelling drainage.  Inability to urinate or empty your bladder within 8 hours.  Problems with breathing or chest pain.    You should call 911 if you develop problems with breathing or chest pain.  If you are unable to contact your doctor or surgical center, you should go to the nearest emergency room or urgent care center.  Physician's telephone #: Dr Amaury Lincoln [phone]    If any questions arise, call your doctor.  If your doctor is not available, please feel free to call the  Surgical Center at [phone]. The Contact Center is open Monday through Friday 7AM to 5PM and may speak to a nurse at [phone], or toll free at [phone].     A registered nurse may call you a few days after your surgery to see how you are doing after your procedure.    MEDICATIONS: Resume taking daily medication.  Take prescribed pain medication with food.  If no medication is prescribed, you may take non-aspirin pain medication if needed.  PAIN MEDICATION CAN BE VERY CONSTIPATING.  Take a stool softener or laxative such as senokot, pericolace, or milk of magnesia if needed.    Prescription given for percocet and motrin.  Last pain medication given at **oxycodone 6:15pm *.    If your physician has prescribed pain medication that includes Acetaminophen (Tylenol), do not take additional Acetaminophen (Tylenol) while taking the prescribed medication.    Depression / Suicide Risk    As you are discharged from this [facility] facility, it is important to learn how to keep safe from harming yourself.    Recognize the warning signs:  · Abrupt changes in personality, positive or negative- including increase in energy   · Giving away possessions  · Change in eating patterns- significant weight changes-  positive or negative  · Change in sleeping patterns- unable to sleep or sleeping all the time   · Unwillingness or inability to communicate  · Depression  · Unusual sadness, discouragement and loneliness  · Talk of wanting to die  · Neglect of personal appearance   · Rebelliousness- reckless behavior  · Withdrawal from people/activities they love  · Confusion- inability to concentrate     If you or a loved one observes any of these behaviors or has concerns about self-harm, here's what you can do:  · Talk about it- your feelings and reasons for harming yourself  · Remove any means that you might use to hurt yourself (examples: pills, rope, extension cords, firearm)  · Get professional help from the community  (Mental Health, Substance Abuse, psychological counseling)  · Do not be alone:Call your Safe Contact- someone whom you trust who will be there for you.  · Call your local CRISIS HOTLINE 857-4597 or 072-495-4691  · Call your local Children's Mobile Crisis Response Team [place] (381) 013-7508 or www.sfilatino  · Call the toll free National Suicide Prevention Hotlines   · National Suicide Prevention Lifeline 455-861-ZYHX (2104)  · National Hope Line Network 800-SUICIDE (512-4214)

## 2020-11-20 NOTE — ANESTHESIA POSTPROCEDURE EVALUATION
Patient: Symone Salmon    Procedure Summary     Date: 11/19/20 Room / Location: [facility] ROOM 22 / SURGERY SAME DAY [facility]    Anesthesia Start: 1620 Anesthesia Stop: 1757    Procedures:       PELVISCOPY (Abdomen)      SALPINGECTOMY (Bilateral Fallopian Tube)      INCISION AND DRAINAGE  of perneal cyst (N/A Perineum) Diagnosis: (PERMANENT STERILIZATION)    Surgeons: Amaury Lincoln M.D. Responsible Provider: Supa Bacon M.D.    Anesthesia Type: general ASA Status: 2          Final Anesthesia Type: general  Last vitals  BP   Blood Pressure: 114/75    Temp   36.7 °C (98.1 °F)    Pulse   Pulse: 61   Resp   14    SpO2   100 %      Anesthesia Post Evaluation    Patient location during evaluation: PACU  Patient participation: complete - patient participated  Level of consciousness: sleepy but conscious    Airway patency: patent  Anesthetic complications: no  Cardiovascular status: hemodynamically stable  Respiratory status: acceptable  Hydration status: balanced    PONV: none                    DC instructions/by Devora BARBER

## 2020-11-20 NOTE — OR SURGEON
Immediate Post OP Note    PreOp Diagnosis:   The patient is parous and she desires permanent tubal sterilization in the form of a laparoscopic bilateral salpingectomy.   Tiny superficial perineal skin abscess.    PostOp Diagnosis:   The patient is parous and she desires permanent tubal sterilization in the form of a laparoscopic bilateral salpingectomy.   Tiny superficial perineal skin abscess.    Procedure(s):  PELVISCOPY - Wound Class: Clean Contaminated  SALPINGECTOMY - Wound Class: Clean Contaminated  INCISION AND DRAINAGE  of perneal cyst - Wound Class: Clean Contaminated    Surgeon(s):  Amaury Lincoln M.D.    Anesthesiologist/Type of Anesthesia:  Anesthesiologist: Supa Bacon M.D./General    Surgical Staff:  Circulator: TARUN Lockwood Circulator: TARUN Barraza Scrub: Carlos Tijerina  Scrub Person: Tamika Bridges    Specimens removed if any:  ID Type Source Tests Collected by Time Destination   A : bilateral fallopian tubes  Tissue Fallopian Tube PATHOLOGY SPECIMEN Amaury Lincoln M.D. 11/19/2020  3:48 PM        Estimated Blood Loss:   Less than 10 cc.    Findings:   During laparoscopy excellent views of the pelvis are obtained.  The uterus is normal.  The cul-de-sac is normal.  Both fallopian tubes are normal.  Both ovaries are normal.  Both ovarian fossa are normal.  The liver edge appears normal.  There are some adhesions around the cecum.    Complications:   None        11/19/2020 5:46 PM Amaury Lincoln M.D.

## 2020-11-20 NOTE — ANESTHESIA PROCEDURE NOTES
Airway    Date/Time: 11/19/2020 4:38 PM  Performed by: Supa Bacon M.D.  Authorized by: Supa Bacon M.D.     Location:  OR  Urgency:  Elective  Difficult Airway: No    Indications for Airway Management:  Anesthesia      Spontaneous Ventilation: absent    Sedation Level:  Deep  Preoxygenated: Yes    Patient Position:  Sniffing  Mask Difficulty Assessment:  1 - vent by mask  Final Airway Type:  Endotracheal airway  Final Endotracheal Airway:  ETT  Cuffed: Yes    Technique Used for Successful ETT Placement:  Direct laryngoscopy    Insertion Site:  Oral  Blade Type:  Kevin  Laryngoscope Blade/Videolaryngoscope Blade Size:  2  ETT Size (mm):  7.0  Measured from:  Teeth  ETT to Teeth (cm):  21  Placement Verified by: auscultation and capnometry    Cormack-Lehane Classification:  Grade I - full view of glottis  Number of Attempts at Approach:  1

## 2020-11-20 NOTE — OP REPORT
DATE OF SERVICE:  11/19/2020    DATE OF PROCEDURE:  11/19/2020.    PREOPERATIVE DIAGNOSES:  1.  The patient is parous and she desires permanent tubal sterilization in the   form of laparoscopic bilateral salpingectomy.  2.  A tiny superficial perineal skin abscess.    POSTOPERATIVE DIAGNOSES:  1.  The patient is parous and she desires permanent tubal sterilization in the   form of laparoscopic bilateral salpingectomy.  2.  A tiny superficial perineal skin abscess.    PROCEDURES:  1.  Laparoscopy with laparoscopic bilateral salpingectomy.  2.  Incision and drainage of a tiny superficial perineal skin abscess.    SURGEON:  Amaury Lincoln MD    ANESTHESIA:  General endotracheal tube anesthesia.    ANESTHESIOLOGIST:  Supa Bacon MD    FINDINGS:  During laparoscopy, excellent views of the pelvis are obtained.    The uterus was normal.  The cul-de-sac is normal.  Both fallopian tubes are   normal.  Both ovaries are normal.  Both ovarian fossae are normal.  The liver   edge appears normal.  There were some adhesions around the cecum.    There is a tiny superficial skin abscess or cyst in the epidermis of the   perineum in the midline and when this is incised, a tiny amount of clear fluid   is expressed.    SPECIMENS:  Bilateral fallopian tubes.    COMPLICATIONS:  None.    ESTIMATED BLOOD LOSS:  Less than 10 mL    DESCRIPTION OF PROCEDURE:  After appropriate consents have been obtained, the   patient was taken to the operating room and given general anesthesia,   positioned, prepped and draped in the dorsal lithotomy position and the   bladder was emptied with a catheter.  A sponge stick was placed in the vaginal   vault.  Attention is directed to the abdomen where a small (approximately   1-1.5 cm) horizontal infraumbilical incision was made with scalpel.  Veress   needle was advanced through this incision into the peritoneal cavity and   proper placement in the peritoneal cavity was verified with palmar  hanging   drop technique.  The peritoneal cavity is then insufflated with approximately   2-3 liters of carbon dioxide and gas.  The Veress needle was removed and a   10-12 mm port was introduced through the infraumbilical incision into the   peritoneal cavity utilizing the VersaStep trocar system.  The central portion   of this port was removed and the 10 mm operative laparoscope was inserted   through the remaining sleeve and proper entry in the peritoneal cavity was   verified visually with laparoscope.  The patient was placed in Trendelenburg   position.  A 5 mm port was placed suprapubically under direct laparoscopic   visualization utilizing the VersaStep trocar system.  The Prestige instrument   was placed through the suprapubic port and the long 5 mm LigaSure bipolar   cutting forceps instrument was placed in the operative channel of the   operative laparoscope.  The uterus was anteverted with the Prestige   instrument.  The right fallopian tube was clearly identified.  Pictures of the   right fallopian tube were taken.  The right fallopian tube was grasped with a   Prestige instrument.  After the distal fimbriated end of the right fallopian   tube was clearly identified, the right fallopian tube was resected (right   salpingectomy was performed) in the usual fashion using the LigaSure   instrument.  This is accomplished by serially thoroughly cauterizing, sealing   and cutting the right mesosalpinx from distal to proximal in the usual fashion   and then the proximal right fallopian tube was thoroughly cauterized, sealed,   and cut with LigaSure instrument.  Excellent hemostasis was observed.  The   right fallopian tube was grasped with the LigaSure instrument and brought out   through the infraumbilical port along with the laparoscope and the right   fallopian tube was then submitted as a specimen.  The laparoscope was then   placed again through the infraumbilical port and the uterus was anteverted   with  a Prestige instrument and hemostasis was noted to be excellent.  The left   fallopian tube was clearly identified and followed to its distal fimbriated   end.  The Prestige instrument was used to grasp the left fallopian tube and   after the left fallopian tube was clearly identified and followed to its   distal fimbriated end, the left fallopian tube was resected (left   salpingectomy was performed) in the usual fashion by serially thoroughly   cauterizing, sealing and cutting the left mesosalpinx from distal to proximal   with the LigaSure instrument.  The proximal left fallopian tube was thoroughly   cauterized, sealed, and cut with LigaSure instrument and the left fallopian   tube was grasped with the LigaSure instrument and brought out through the   infraumbilical port along with the laparoscope and the left fallopian tube was   then submitted as a specimen.  The laparoscope was replaced through the   infraumbilical port and the uterus was anteverted with a Prestige instrument   and pictures were taken and excellent hemostasis is noted.  Findings are as   noted above.  The liver was seen and found to be normal and there were some   adhesions around the cecum.  The patient was taken out of Trendelenburg   position.  The Prestige instrument was removed.  The laparoscope was removed.    Air was allowed to evacuate the peritoneal cavity.  Both ports were removed.    The fascia underneath the infraumbilical incision was identified with the use   of S retractors and reapproximated with placement of a simple interrupted   suture using Vicryl.  The infraumbilical skin incision was reapproximated with   placement of 3 interrupted buried sutures of 4-0 Monocryl placed in the   dermis.  The small suprapubic skin incisions were reapproximated with   placement of simple interrupted buried suture of 4-0 Monocryl placed in the   dermis.  The vaginal sponge stick was removed.  The patient was placed in   lithotomy position  using the Yellofin stirrups.  The perineum was carefully   inspected.  A tiny cyst in the midline and the peritoneum was seen underneath   the epidermis and was gently incised with 11 blade scalpel.  A tiny incision   made with 11 blade scalpel.  A tiny amount of clear fluid was seen coming from   the cyst.  The epidermis was reapproximated with placement of simple   interrupted buried suture of 4-0 Monocryl.  Procedure was terminated.  Final   lap and needle counts reported to be correct x2 at the end of the procedure.    The patient tolerated the procedure well and sent to postanesthesia recovery   in stable condition.       ____________________________________     Amaury Lincoln MD    MED / NTS    DD:  11/19/2020 18:04:44  DT:  11/19/2020 22:04:05    D#:  [id number]  Job#:  [id number]    cc: Supa Bacon MD

## 2020-11-20 NOTE — OR NURSING
1750 Pt arrived to PACU from OR via gurney. Report received from OR RN and anesthesiologist. Monitor applied. Pt sleeping, oral airway in place. Respirations even and unlabored. Abdominal dressings CDI    1759 Pt awakening, oral airway DC'd    1815 pt more awake, medicated for pain per MAR, tolerating PO liquid, denies nausea    1840 VSS, handoff to Talia RAE

## 2021-08-09 ENCOUNTER — PRE-ADMISSION TESTING (OUTPATIENT)
Dept: ADMISSIONS | Facility: MEDICAL CENTER | Age: 36
End: 2021-08-09
Attending: SPECIALIST
Payer: COMMERCIAL

## 2021-08-19 ENCOUNTER — ANESTHESIA EVENT (OUTPATIENT)
Dept: SURGERY | Facility: MEDICAL CENTER | Age: 36
End: 2021-08-19
Payer: COMMERCIAL

## 2021-08-19 ENCOUNTER — HOSPITAL ENCOUNTER (OUTPATIENT)
Facility: MEDICAL CENTER | Age: 36
End: 2021-08-19
Attending: SPECIALIST | Admitting: SPECIALIST
Payer: COMMERCIAL

## 2021-08-19 ENCOUNTER — ANESTHESIA (OUTPATIENT)
Dept: SURGERY | Facility: MEDICAL CENTER | Age: 36
End: 2021-08-19
Payer: COMMERCIAL

## 2021-08-19 VITALS
HEIGHT: 63 IN | OXYGEN SATURATION: 98 % | SYSTOLIC BLOOD PRESSURE: 107 MMHG | TEMPERATURE: 97 F | HEART RATE: 52 BPM | BODY MASS INDEX: 21.37 KG/M2 | WEIGHT: 120.59 LBS | RESPIRATION RATE: 16 BRPM | DIASTOLIC BLOOD PRESSURE: 69 MMHG

## 2021-08-19 LAB
HCG UR QL: NEGATIVE
PATHOLOGY CONSULT NOTE: NORMAL

## 2021-08-19 PROCEDURE — 700101 HCHG RX REV CODE 250: Performed by: ANESTHESIOLOGY

## 2021-08-19 PROCEDURE — 160048 HCHG OR STATISTICAL LEVEL 1-5: Performed by: SPECIALIST

## 2021-08-19 PROCEDURE — 502587 HCHG PACK, D&C: Performed by: SPECIALIST

## 2021-08-19 PROCEDURE — 160046 HCHG PACU - 1ST 60 MINS PHASE II: Performed by: SPECIALIST

## 2021-08-19 PROCEDURE — 88305 TISSUE EXAM BY PATHOLOGIST: CPT

## 2021-08-19 PROCEDURE — 160036 HCHG PACU - EA ADDL 30 MINS PHASE I: Performed by: SPECIALIST

## 2021-08-19 PROCEDURE — 160035 HCHG PACU - 1ST 60 MINS PHASE I: Performed by: SPECIALIST

## 2021-08-19 PROCEDURE — 700111 HCHG RX REV CODE 636 W/ 250 OVERRIDE (IP): Performed by: ANESTHESIOLOGY

## 2021-08-19 PROCEDURE — 700105 HCHG RX REV CODE 258: Performed by: SPECIALIST

## 2021-08-19 PROCEDURE — 160041 HCHG SURGERY MINUTES - EA ADDL 1 MIN LEVEL 4: Performed by: SPECIALIST

## 2021-08-19 PROCEDURE — A9270 NON-COVERED ITEM OR SERVICE: HCPCS | Performed by: ANESTHESIOLOGY

## 2021-08-19 PROCEDURE — 160002 HCHG RECOVERY MINUTES (STAT): Performed by: SPECIALIST

## 2021-08-19 PROCEDURE — 160029 HCHG SURGERY MINUTES - 1ST 30 MINS LEVEL 4: Performed by: SPECIALIST

## 2021-08-19 PROCEDURE — 160025 RECOVERY II MINUTES (STATS): Performed by: SPECIALIST

## 2021-08-19 PROCEDURE — 160009 HCHG ANES TIME/MIN: Performed by: SPECIALIST

## 2021-08-19 PROCEDURE — 700102 HCHG RX REV CODE 250 W/ 637 OVERRIDE(OP): Performed by: ANESTHESIOLOGY

## 2021-08-19 PROCEDURE — 81025 URINE PREGNANCY TEST: CPT

## 2021-08-19 PROCEDURE — 501394 HCHG SOLUTION SORBITOL 3% 3000ML BAG: Performed by: SPECIALIST

## 2021-08-19 RX ORDER — DIPHENHYDRAMINE HYDROCHLORIDE 50 MG/ML
12.5 INJECTION INTRAMUSCULAR; INTRAVENOUS
Status: DISCONTINUED | OUTPATIENT
Start: 2021-08-19 | End: 2021-08-19 | Stop reason: HOSPADM

## 2021-08-19 RX ORDER — LIDOCAINE HYDROCHLORIDE 20 MG/ML
INJECTION, SOLUTION EPIDURAL; INFILTRATION; INTRACAUDAL; PERINEURAL PRN
Status: DISCONTINUED | OUTPATIENT
Start: 2021-08-19 | End: 2021-08-19 | Stop reason: SURG

## 2021-08-19 RX ORDER — HYDROMORPHONE HYDROCHLORIDE 1 MG/ML
0.4 INJECTION, SOLUTION INTRAMUSCULAR; INTRAVENOUS; SUBCUTANEOUS
Status: DISCONTINUED | OUTPATIENT
Start: 2021-08-19 | End: 2021-08-19 | Stop reason: HOSPADM

## 2021-08-19 RX ORDER — SIMETHICONE 80 MG
80 TABLET,CHEWABLE ORAL EVERY 8 HOURS PRN
Status: CANCELLED | OUTPATIENT
Start: 2021-08-19

## 2021-08-19 RX ORDER — HYDROMORPHONE HYDROCHLORIDE 1 MG/ML
0.2 INJECTION, SOLUTION INTRAMUSCULAR; INTRAVENOUS; SUBCUTANEOUS
Status: DISCONTINUED | OUTPATIENT
Start: 2021-08-19 | End: 2021-08-19 | Stop reason: HOSPADM

## 2021-08-19 RX ORDER — MEPERIDINE HYDROCHLORIDE 25 MG/ML
6.25 INJECTION INTRAMUSCULAR; INTRAVENOUS; SUBCUTANEOUS
Status: DISCONTINUED | OUTPATIENT
Start: 2021-08-19 | End: 2021-08-19 | Stop reason: HOSPADM

## 2021-08-19 RX ORDER — OXYCODONE HCL 5 MG/5 ML
10 SOLUTION, ORAL ORAL
Status: COMPLETED | OUTPATIENT
Start: 2021-08-19 | End: 2021-08-19

## 2021-08-19 RX ORDER — HYDROMORPHONE HYDROCHLORIDE 1 MG/ML
0.1 INJECTION, SOLUTION INTRAMUSCULAR; INTRAVENOUS; SUBCUTANEOUS
Status: DISCONTINUED | OUTPATIENT
Start: 2021-08-19 | End: 2021-08-19 | Stop reason: HOSPADM

## 2021-08-19 RX ORDER — DEXAMETHASONE SODIUM PHOSPHATE 4 MG/ML
INJECTION, SOLUTION INTRA-ARTICULAR; INTRALESIONAL; INTRAMUSCULAR; INTRAVENOUS; SOFT TISSUE PRN
Status: DISCONTINUED | OUTPATIENT
Start: 2021-08-19 | End: 2021-08-19 | Stop reason: SURG

## 2021-08-19 RX ORDER — PROMETHAZINE HYDROCHLORIDE 25 MG/1
12.5 SUPPOSITORY RECTAL EVERY 4 HOURS PRN
Status: CANCELLED | OUTPATIENT
Start: 2021-08-19

## 2021-08-19 RX ORDER — HALOPERIDOL 5 MG/ML
1 INJECTION INTRAMUSCULAR
Status: DISCONTINUED | OUTPATIENT
Start: 2021-08-19 | End: 2021-08-19 | Stop reason: HOSPADM

## 2021-08-19 RX ORDER — ONDANSETRON 2 MG/ML
4 INJECTION INTRAMUSCULAR; INTRAVENOUS
Status: COMPLETED | OUTPATIENT
Start: 2021-08-19 | End: 2021-08-19

## 2021-08-19 RX ORDER — KETOROLAC TROMETHAMINE 30 MG/ML
INJECTION, SOLUTION INTRAMUSCULAR; INTRAVENOUS PRN
Status: DISCONTINUED | OUTPATIENT
Start: 2021-08-19 | End: 2021-08-19 | Stop reason: SURG

## 2021-08-19 RX ORDER — OXYCODONE HCL 5 MG/5 ML
5 SOLUTION, ORAL ORAL
Status: COMPLETED | OUTPATIENT
Start: 2021-08-19 | End: 2021-08-19

## 2021-08-19 RX ORDER — SODIUM CHLORIDE, SODIUM LACTATE, POTASSIUM CHLORIDE, CALCIUM CHLORIDE 600; 310; 30; 20 MG/100ML; MG/100ML; MG/100ML; MG/100ML
INJECTION, SOLUTION INTRAVENOUS CONTINUOUS
Status: ACTIVE | OUTPATIENT
Start: 2021-08-19 | End: 2021-08-19

## 2021-08-19 RX ADMIN — LIDOCAINE HYDROCHLORIDE 20 MG: 20 INJECTION, SOLUTION EPIDURAL; INFILTRATION; INTRACAUDAL at 11:13

## 2021-08-19 RX ADMIN — HYDROMORPHONE HYDROCHLORIDE 0.4 MG: 1 INJECTION, SOLUTION INTRAMUSCULAR; INTRAVENOUS; SUBCUTANEOUS at 13:37

## 2021-08-19 RX ADMIN — DEXAMETHASONE SODIUM PHOSPHATE 4 MG: 4 INJECTION, SOLUTION INTRA-ARTICULAR; INTRALESIONAL; INTRAMUSCULAR; INTRAVENOUS; SOFT TISSUE at 11:19

## 2021-08-19 RX ADMIN — OXYCODONE HYDROCHLORIDE 10 MG: 5 SOLUTION ORAL at 12:29

## 2021-08-19 RX ADMIN — FENTANYL CITRATE 25 MCG: 50 INJECTION, SOLUTION INTRAMUSCULAR; INTRAVENOUS at 12:26

## 2021-08-19 RX ADMIN — FENTANYL CITRATE 50 MCG: 50 INJECTION, SOLUTION INTRAMUSCULAR; INTRAVENOUS at 11:13

## 2021-08-19 RX ADMIN — PROPOFOL 150 MG: 10 INJECTION, EMULSION INTRAVENOUS at 11:13

## 2021-08-19 RX ADMIN — MEPERIDINE HYDROCHLORIDE 6.25 MG: 25 INJECTION INTRAMUSCULAR; INTRAVENOUS; SUBCUTANEOUS at 12:47

## 2021-08-19 RX ADMIN — FENTANYL CITRATE 50 MCG: 50 INJECTION, SOLUTION INTRAMUSCULAR; INTRAVENOUS at 12:05

## 2021-08-19 RX ADMIN — ONDANSETRON 4 MG: 2 INJECTION INTRAMUSCULAR; INTRAVENOUS at 13:28

## 2021-08-19 RX ADMIN — FENTANYL CITRATE 50 MCG: 50 INJECTION, SOLUTION INTRAMUSCULAR; INTRAVENOUS at 12:47

## 2021-08-19 RX ADMIN — MEPERIDINE HYDROCHLORIDE 6.25 MG: 25 INJECTION INTRAMUSCULAR; INTRAVENOUS; SUBCUTANEOUS at 12:55

## 2021-08-19 RX ADMIN — KETOROLAC TROMETHAMINE 30 MG: 30 INJECTION, SOLUTION INTRAMUSCULAR at 12:06

## 2021-08-19 RX ADMIN — SODIUM CHLORIDE, POTASSIUM CHLORIDE, SODIUM LACTATE AND CALCIUM CHLORIDE: 600; 310; 30; 20 INJECTION, SOLUTION INTRAVENOUS at 08:41

## 2021-08-19 ASSESSMENT — PAIN DESCRIPTION - PAIN TYPE
TYPE: SURGICAL PAIN

## 2021-08-19 ASSESSMENT — PAIN SCALES - GENERAL: PAIN_LEVEL: 0

## 2021-08-19 NOTE — ANESTHESIA POSTPROCEDURE EVALUATION
Patient: Symone Salmon    Procedure Summary     Date: 08/19/21 Room / Location: [facility] ROOM 25 / SURGERY SAME DAY [facility]    Anesthesia Start: 1109 Anesthesia Stop: 1219    Procedures:       HYSTEROSCOPY, WITH VIDEO IMAGING (N/A Uterus)      DILATION AND CURETTAGE (N/A Uterus)      ABLATION, ENDOMETRIUM, THERMAL, HYSTEROSCOPIC (N/A Uterus) Diagnosis: (MENORRHAGIA, DYSMENORRHEA, DYSPAREUNIA)    Surgeons: Amaury Lincoln M.D. Responsible Provider: Joy Celaya M.D.    Anesthesia Type: general ASA Status: 1          Final Anesthesia Type: general  Last vitals  BP   Blood Pressure: 109/67    Temp   35.9 °C (96.7 °F)    Pulse   75   Resp   13    SpO2   99 %      Anesthesia Post Evaluation    Patient location during evaluation: PACU  Patient participation: complete - patient participated  Level of consciousness: awake and alert  Pain score: 0    Airway patency: patent  Anesthetic complications: no  Cardiovascular status: hemodynamically stable  Respiratory status: acceptable  Hydration status: euvolemic    PONV: none          No complications documented.     Nurse Pain Score: 0 (NPRS)

## 2021-08-19 NOTE — PROGRESS NOTES
1220- Patient arrived in PACU and was connected to monitors. Report received from anesthesiologist and OR RN. Vital signs are stable, patient is on 4L mask, unlabored breathing. Patient is arousable on calling.     1227- Report given to BUTCH Hanna.

## 2021-08-19 NOTE — DISCHARGE INSTRUCTIONS
ACTIVITY: Rest and take it easy for the first 24 hours.  A responsible adult is recommended to remain with you during that time.  It is normal to feel sleepy.  We encourage you to not do anything that requires balance, judgment or coordination.    MILD FLU-LIKE SYMPTOMS ARE NORMAL. YOU MAY EXPERIENCE GENERALIZED MUSCLE ACHES, THROAT IRRITATION, HEADACHE AND/OR SOME NAUSEA.    FOR 24 HOURS DO NOT:  Drive, operate machinery or run household appliances.  Drink beer or alcoholic beverages.   Make important decisions or sign legal documents.    SPECIAL INSTRUCTIONS: see attached handout    DIET: To avoid nausea, slowly advance diet as tolerated, avoiding spicy or greasy foods for the first day.  Add more substantial food to your diet according to your physician's instructions.  Babies can be fed formula or breast milk as soon as they are hungry.  INCREASE FLUIDS AND FIBER TO AVOID CONSTIPATION.    SURGICAL DRESSING/BATHING: ok to shower tomorrow; no submerging (bath, hot tub, swimming...) until cleared by DrEli    FOLLOW-UP APPOINTMENT:  A follow-up appointment should be arranged with your doctor in 2 weeks; call to schedule.    You should CALL YOUR PHYSICIAN if you develop:  Fever greater than 101 degrees F.  Pain not relieved by medication, or persistent nausea or vomiting.  Excessive bleeding (blood soaking through dressing) or unexpected drainage from the wound.  Extreme redness or swelling around the incision site, drainage of pus or foul smelling drainage.  Inability to urinate or empty your bladder within 8 hours.  Problems with breathing or chest pain.    You should call 911 if you develop problems with breathing or chest pain.  If you are unable to contact your doctor or surgical center, you should go to the nearest emergency room or urgent care center.  Physician's telephone #: [phone]    If any questions arise, call your doctor.  If your doctor is not available, please feel free to call the Surgical  Center at [phone]. The Contact Center is open Monday through Friday 7AM to 5PM and may speak to a nurse at [phone], or toll free at [phone].     A registered nurse may call you a few days after your surgery to see how you are doing after your procedure.    MEDICATIONS: Resume taking daily medication.  Take prescribed pain medication with food.  If no medication is prescribed, you may take non-aspirin pain medication if needed.  PAIN MEDICATION CAN BE VERY CONSTIPATING.  Take a stool softener or laxative such as senokot, pericolace, or milk of magnesia if needed.   Last pain medication given at 12:30 pm- OXYCODONE. 12:00 pm- TORADOL (anti-inflammatory similar to ibuprofen).    If your physician has prescribed pain medication that includes Acetaminophen (Tylenol), do not take additional Acetaminophen (Tylenol) while taking the prescribed medication.    Depression / Suicide Risk    As you are discharged from this [facility] facility, it is important to learn how to keep safe from harming yourself.    Recognize the warning signs:  · Abrupt changes in personality, positive or negative- including increase in energy   · Giving away possessions  · Change in eating patterns- significant weight changes-  positive or negative  · Change in sleeping patterns- unable to sleep or sleeping all the time   · Unwillingness or inability to communicate  · Depression  · Unusual sadness, discouragement and loneliness  · Talk of wanting to die  · Neglect of personal appearance   · Rebelliousness- reckless behavior  · Withdrawal from people/activities they love  · Confusion- inability to concentrate     If you or a loved one observes any of these behaviors or has concerns about self-harm, here's what you can do:  · Talk about it- your feelings and reasons for harming yourself  · Remove any means that you might use to hurt yourself (examples: pills, rope, extension cords, firearm)  · Get professional help from the  community (Mental Health, Substance Abuse, psychological counseling)  · Do not be alone:Call your Safe Contact- someone whom you trust who will be there for you.  · Call your local CRISIS HOTLINE 819-2625 or 094-034-2996  · Call your local Children's Mobile Crisis Response Team [place] (103) 921-5486 or www.AppsFlyer  · Call the toll free National Suicide Prevention Hotlines   · National Suicide Prevention Lifeline 579-937-YJMD (4917)  · National Hope Line Network 800-SUICIDE (073-2271)

## 2021-08-19 NOTE — OR NURSING
1247 demerol given for shivering. Warmer applied.     1315 Spoke to pt's spouse & gave status update & discussed plan of care. Will call again when ready to discharge.   Heat pack applied to abdomen.     Pt states pain is tolerable now.     1328 Nausea treated; see MAR.     1337 Pt states pain is back to 8-9/10. Medicated per MAR. 2L 02 NC applied.     1412 Discharge instructions reviewed with family member. All questions answered, verbalizes understanding.     IV dc'd, ID bands removed, assisted to change into own clothing. All personal belongings with pt.     1430 Transported to car via wheelchair, accompanied by CNA

## 2021-08-19 NOTE — ANESTHESIA PROCEDURE NOTES
Airway    Date/Time: 8/19/2021 11:16 AM  Performed by: Joy Celaya M.D.  Authorized by: Joy Celaya M.D.     Location:  OR  Urgency:  Elective  Indications for Airway Management:  Anesthesia      Spontaneous Ventilation: absent    Sedation Level:  Deep  Preoxygenated: Yes    Mask Difficulty Assessment:  1 - vent by mask  Final Airway Type:  Supraglottic airway  Final Supraglottic Airway:  Standard LMA    SGA Size:  4  Number of Attempts at Approach:  1

## 2021-08-19 NOTE — ANESTHESIA TIME REPORT
Anesthesia Start and Stop Event Times     Date Time Event    8/19/2021 10:31 AM Ready for Procedure    8/19/2021 11:09 AM Anesthesia Start        Responsible Staff  08/19/21    Name Role Begin End    Joy Celaya M.D. Anesthesiologist 08/19/21 11:09 AM Not recorded        Preop Diagnosis (Free Text):  Pre-op Diagnosis     MENORRHAGIA, DYSMENORRHEA, DYSPAREUNIA        Preop Diagnosis (Codes):    Post op Diagnosis  Menorrhalgia      Premium Reason  Non-Premium    Comments:

## 2021-08-19 NOTE — OR SURGEON
Immediate Post OP Note    PreOp Diagnosis:   Menorrhagia    PostOp Diagnosis:   Menorrhagia    Procedure(s):  HYSTEROSCOPY, WITH VIDEO IMAGING - Wound Class: Clean Contaminated  DILATION AND CURETTAGE - Wound Class: Clean Contaminated  ABLATION, ENDOMETRIUM, THERMAL, HYSTEROSCOPIC - Wound Class: Clean Contaminated    Surgeon(s):  Amaury Lincoln M.D.    Anesthesiologist/Type of Anesthesia:  Anesthesiologist: Joy Celaya M.D./General    Surgical Staff:  Circulator: Dannielle Reddy R.N.  Relief Scrub: Elvia Barry  Scrub Person: Tere Martinezmons    Specimens removed if any:  ID Type Source Tests Collected by Time Destination   A : [facility] Other PATHOLOGY SPECIMEN Amaury Lincoln M.D. 8/19/2021 11:55 AM        Estimated Blood Loss:   Less than 30 cc.    Findings:   Speculum exam under anesthesia reveals no vulvar or vaginal or cervical lesions.  The cervix is well visualized and is multiparous.  During hysteroscopy excellent views of the intrauterine cavity obtained.  Both tubal ostia are well visualized.  During hysteroscopy no evidence of any congenital uterine anomaly is seen and no evidence of endometrial polyp is seen and no evidence of any submucosal fibroid is seen.  When hysteroscopy is performed during and then continued following endometrial ablation the entire intrauterine cavity is found to be nicely and thoroughly ablated.    Complications:   None.        8/19/2021 12:15 PM Amaury Lincoln M.D.

## 2021-08-19 NOTE — OP REPORT
DATE OF SERVICE:  08/19/2021     PREOPERATIVE DIAGNOSIS:  Menorrhagia.     POSTOPERATIVE DIAGNOSIS:  Menorrhagia.     PROCEDURES:  Hysteroscopy, D and C, hydrothermal endometrial ablation.     SURGEON:  Amaury Lincoln MD     ANESTHESIA:  General anesthesia with laryngeal mask airway.     FINDINGS:  Speculum exam under anesthesia revealed no vulvar or vaginal or   cervical lesions.  The cervix was well visualized and was multiparous.     During hysteroscopy, excellent views of the intrauterine cavity were obtained.    Both tubal ostia were visualized.  During hysteroscopy, no evidence of any   congenital uterine anomaly was seen and no evidence of endometrial polyp was   seen and no evidence of any submucosal fibroid was seen.     When hysteroscopy was performed during and then continued following   endometrial ablation, the entire intrauterine cavity was found to be nicely   and thoroughly ablated.     SPECIMENS:  Endometrial curettings.     COMPLICATIONS:  None.     ESTIMATED BLOOD LOSS:  Less than 30 mL     DESCRIPTION OF PROCEDURE:  After the appropriate consents have been obtained,   the patient was taken to the operating room and given general anesthesia.  She   was prepped and draped in the dorsal lithotomy position and the bladder was   emptied with a catheter.  Speculum exam was performed and revealed that there   were no vulvar, vaginal or cervical lesions.  The cervix was well visualized   and was multiparous.  The cervix was dilated with Hanks and then Hegar   dilators and was dilated to a Hegar #8.  The hysteroscope was advanced through   the endocervical canal into the intrauterine cavity and hysteroscopy was   performed and excellent views of the intrauterine cavity were obtained and   during hysteroscopy, both tubal ostia were clearly identified and during   hysteroscopy, no evidence of any congenital uterine anomaly was seen and no   evidence of any submucosal fibroid was seen and no evidence of  endometrial   polyp was seen.  The hysteroscope was removed.  A sharp curette was inserted   through the endocervical canal into the intrauterine cavity and all 4   quadrants of the intrauterine cavity were thoroughly curetted and curettings   were submitted on a Telfa pad.  The sharp curette was removed and the   hysteroscope was reinserted through the endocervical canal into the   intrauterine cavity and hysteroscopy was repeated and again excellent views of   the intrauterine cavity were obtained and again both tubal ostia were clearly   visualized.  Then, hydrothermal endometrial ablation was performed.  After   the usual 90-second warmup period, hydrothermal endometrial ablation was   continued for 10 minutes.  Then, after the usual 90-second cool down period,   hysteroscopy was continued and when hysteroscopy was continued following   hydrothermal endometrial ablation, the entire intrauterine cavity was found to   be nicely and thoroughly ablated.  Pictures were taken.  The hysteroscope was   then removed.  The single tooth tenaculum was then removed from the cervix.    Some bleeding seen coming from the site of attachment of the single tooth   tenaculum, was controlled with the placement of pressure using a gauze sponge   and then the application of silver nitrate.  The cervix was then examined and   this time, no bleeding seen coming from the cervix either from the site of   attachment of the single tooth tenaculum or from the external cervical os.    The speculum was removed.  The procedure was terminated and the patient   tolerated the procedure well and sent to postanesthesia recovery in stable   condition.        ______________________________  Amaury Lincoln MD    MED/KRI    DD:  08/19/2021 12:32  DT:  08/19/2021 13:22    Job#:  [id number]    CC:Joy Celaya MD(User)

## 2023-07-25 ENCOUNTER — APPOINTMENT (OUTPATIENT)
Dept: MEDICAL GROUP | Facility: PHYSICIAN GROUP | Age: 38
End: 2023-07-25
Payer: OTHER GOVERNMENT

## (undated) DEVICE — KIT ANESTHESIA W/CIRCUIT & 3/LT BAG W/FILTER (20EA/CA)

## (undated) DEVICE — SENSOR SPO2 NEO LNCS ADHESIVE (20/BX) SEE USER NOTES

## (undated) DEVICE — ELECTRODE 850 FOAM ADHESIVE - HYDROGEL RADIOTRNSPRNT (50/PK)

## (undated) DEVICE — TOWEL STOP TIMEOUT SAFETY FLAG (40EA/CA)

## (undated) DEVICE — KIT HTA GENESYS - (5/BX)

## (undated) DEVICE — NEEDLE INSUFFLATION FOR STEP - (12/BX)

## (undated) DEVICE — SUTURE 0 VICRYL PLUS UR-6 - 27 INCH (36/BX)

## (undated) DEVICE — TRAY SRGPRP PVP IOD WT PRP - (20/CA)

## (undated) DEVICE — GLOVE BIOGEL SZ 7.5 SURGICAL PF LTX - (50PR/BX 4BX/CA)

## (undated) DEVICE — PAD SANITARY 11IN MAXI IND WRAPPED  (12EA/PK 24PK/CA)

## (undated) DEVICE — CANISTER SUCTION RIGID RED 1500CC (40EA/CA)

## (undated) DEVICE — SODIUM CHL IRRIGATION 0.9% 1000ML (12EA/CA)

## (undated) DEVICE — ARMREST CRADLE FOAM - (2PR/PK 12PR/CA)

## (undated) DEVICE — SLEEVE, VASO, THIGH, MED

## (undated) DEVICE — SUTURE GENERAL

## (undated) DEVICE — TROCAR STEP 11MM - (3/CA)

## (undated) DEVICE — SUCTION INSTRUMENT YANKAUER BULBOUS TIP W/O VENT (50EA/CA)

## (undated) DEVICE — Device

## (undated) DEVICE — MASK ANESTHESIA ADULT  - (100/CA)

## (undated) DEVICE — GOWN WARMING STANDARD FLEX - (30/CA)

## (undated) DEVICE — TUBE CONNECTING SUCTION - CLEAR PLASTIC STERILE 72 IN (50EA/CA)

## (undated) DEVICE — HEAD HOLDER JUNIOR/ADULT

## (undated) DEVICE — TUBING INFLOW HYSTEROSCOPY (10EA/CA)

## (undated) DEVICE — CANISTER SUCTION 3000ML MECHANICAL FILTER AUTO SHUTOFF MEDI-VAC NONSTERILE LF DISP  (40EA/CA)

## (undated) DEVICE — CATHETER IV 20 GA X 1-1/4 ---SURG.& SDS ONLY--- (50EA/BX)

## (undated) DEVICE — TUBING CLEARLINK DUO-VENT - C-FLO (48EA/CA)

## (undated) DEVICE — PROTECTOR ULNA NERVE - (36PR/CA)

## (undated) DEVICE — PACK LAPAROSCOPY - (1/CA)

## (undated) DEVICE — CHLORAPREP 26 ML APPLICATOR - ORANGE TINT(25/CA)

## (undated) DEVICE — SET LEADWIRE 5 LEAD BEDSIDE DISPOSABLE ECG (1SET OF 5/EA)

## (undated) DEVICE — KIT  I.V. START (100EA/CA)

## (undated) DEVICE — TUBING OUTFLOW HYSTEROSCPY (10EA/BX)

## (undated) DEVICE — WATER IRRIGATION STERILE 1000ML (12EA/CA)

## (undated) DEVICE — DRAPE 36X28IN RAD CARM BND BG - (25/CA) O

## (undated) DEVICE — ELECTRODE DUAL RETURN W/ CORD - (50/PK)

## (undated) DEVICE — TROCAR STEP 5MM - (3/CA)

## (undated) DEVICE — SLEEVE VASO CALF MED - (10PR/CA)

## (undated) DEVICE — SUTURE 4-0 MONOCRYL PLUS PS-2 - 27 INCH (36/BX)

## (undated) DEVICE — LACTATED RINGERS INJ 1000 ML - (14EA/CA 60CA/PF)

## (undated) DEVICE — BANDAID SHEER STRIP 3/4 IN (100EA/BX 12BX/CA)

## (undated) DEVICE — GOWN SURGEONS X-LARGE - DISP. (30/CA)

## (undated) DEVICE — SOLUTION SORBITOL 3000ML (4/CA)

## (undated) DEVICE — BANDAID X-LARGE 2 X 4 IN LF (50EA/BX)

## (undated) DEVICE — SODIUM CHL. IRRIGATION 0.9% 3000ML (4EA/CA 65CA/PF)

## (undated) DEVICE — ADHESIVE DERMABOND HVD MINI (12EA/BX)

## (undated) DEVICE — MANIFOLD NEPTUNE 1 PORT (20/PK)

## (undated) DEVICE — LIGASURE 5MM BLUNT TIP LONG - 44CM (6EA/PK)

## (undated) DEVICE — DRAPE VAGINAL BIB W/ POUCH (10EA/CA)

## (undated) DEVICE — NEPTUNE 4 PORT MANIFOLD - (20/PK)

## (undated) DEVICE — DRAPE UNDER BUTTOCKS FLUID - (20/CA)

## (undated) DEVICE — SYRINGE SAFETY 3 ML 18 GA X 1 1/2 BLUNT LL (100/BX 8BX/CA)